# Patient Record
Sex: FEMALE | Race: WHITE | NOT HISPANIC OR LATINO | Employment: FULL TIME | URBAN - METROPOLITAN AREA
[De-identification: names, ages, dates, MRNs, and addresses within clinical notes are randomized per-mention and may not be internally consistent; named-entity substitution may affect disease eponyms.]

---

## 2023-06-11 ENCOUNTER — HOSPITAL ENCOUNTER (EMERGENCY)
Facility: CLINIC | Age: 28
Discharge: HOME | End: 2023-06-11
Attending: EMERGENCY MEDICINE | Admitting: EMERGENCY MEDICINE
Payer: COMMERCIAL

## 2023-06-11 VITALS
WEIGHT: 138 LBS | SYSTOLIC BLOOD PRESSURE: 118 MMHG | RESPIRATION RATE: 18 BRPM | OXYGEN SATURATION: 98 % | DIASTOLIC BLOOD PRESSURE: 76 MMHG | TEMPERATURE: 99 F | HEIGHT: 61 IN | HEART RATE: 101 BPM | BODY MASS INDEX: 26.06 KG/M2

## 2023-06-11 DIAGNOSIS — F41.9 ANXIETY: Primary | ICD-10-CM

## 2023-06-11 LAB — SARS-COV-2 RNA RESP QL NAA+PROBE: NEGATIVE

## 2023-06-11 PROCEDURE — 99214 OFFICE O/P EST MOD 30 MIN: CPT | Performed by: STUDENT IN AN ORGANIZED HEALTH CARE EDUCATION/TRAINING PROGRAM

## 2023-06-11 PROCEDURE — 90791 PSYCH DIAGNOSTIC EVALUATION: CPT

## 2023-06-11 PROCEDURE — 99285 EMERGENCY DEPT VISIT HI MDM: CPT | Mod: 25

## 2023-06-11 PROCEDURE — 250N000013 HC RX MED GY IP 250 OP 250 PS 637: Performed by: STUDENT IN AN ORGANIZED HEALTH CARE EDUCATION/TRAINING PROGRAM

## 2023-06-11 PROCEDURE — 87635 SARS-COV-2 COVID-19 AMP PRB: CPT | Performed by: EMERGENCY MEDICINE

## 2023-06-11 RX ORDER — SPIRONOLACTONE 100 MG/1
100 TABLET, FILM COATED ORAL DAILY
COMMUNITY

## 2023-06-11 RX ORDER — PROPRANOLOL HCL 60 MG
60 CAPSULE, EXTENDED RELEASE 24HR ORAL DAILY
COMMUNITY

## 2023-06-11 RX ORDER — CLONAZEPAM 0.5 MG/1
0.5 TABLET ORAL ONCE
Status: COMPLETED | OUTPATIENT
Start: 2023-06-11 | End: 2023-06-11

## 2023-06-11 RX ORDER — BUSPIRONE HYDROCHLORIDE 15 MG/1
15 TABLET ORAL 2 TIMES DAILY
COMMUNITY

## 2023-06-11 RX ORDER — HYDROXYZINE HYDROCHLORIDE 25 MG/1
25 TABLET, FILM COATED ORAL 3 TIMES DAILY PRN
COMMUNITY
End: 2023-06-16

## 2023-06-11 RX ORDER — GABAPENTIN 100 MG/1
CAPSULE ORAL
Qty: 63 CAPSULE | Refills: 0 | Status: SHIPPED | OUTPATIENT
Start: 2023-06-11 | End: 2023-06-25

## 2023-06-11 RX ORDER — DESVENLAFAXINE 50 MG/1
50 TABLET, FILM COATED, EXTENDED RELEASE ORAL DAILY
COMMUNITY

## 2023-06-11 RX ADMIN — CLONAZEPAM 0.5 MG: 0.5 TABLET ORAL at 15:22

## 2023-06-11 ASSESSMENT — COLUMBIA-SUICIDE SEVERITY RATING SCALE - C-SSRS
TOTAL  NUMBER OF ABORTED OR SELF INTERRUPTED ATTEMPTS LIFETIME: NO
1. HAVE YOU WISHED YOU WERE DEAD OR WISHED YOU COULD GO TO SLEEP AND NOT WAKE UP?: NO
2. HAVE YOU ACTUALLY HAD ANY THOUGHTS OF KILLING YOURSELF?: NO
ATTEMPT LIFETIME: NO
TOTAL  NUMBER OF INTERRUPTED ATTEMPTS LIFETIME: NO
6. HAVE YOU EVER DONE ANYTHING, STARTED TO DO ANYTHING, OR PREPARED TO DO ANYTHING TO END YOUR LIFE?: NO

## 2023-06-11 ASSESSMENT — ACTIVITIES OF DAILY LIVING (ADL)
ADLS_ACUITY_SCORE: 33
ADLS_ACUITY_SCORE: 35

## 2023-06-11 NOTE — ED TRIAGE NOTES
Patient presents to the ED complaining of one week of anxiety.  Requesting to go to the EMPATH unit.       Triage Assessment     Row Name 06/11/23 1229       Triage Assessment (Adult)    Airway WDL WDL

## 2023-06-11 NOTE — DISCHARGE INSTRUCTIONS
Medication Instructions:    Start gabapentin 100mg three times daily for anxiety x 7 days. Increase to 200mg three times daily if 100mg is not effective.  Continue desvenlafaxine 50mg daily  Continue buspirone 15mg twice daily  Continue propranolol 60mg daily if needed  Hold hydroxyzine 25mg three times daily if needed until you know if gabapentin will be helpful or not    Aftercare Plan    1. Follow up with established providers and supports as scheduled. Continue taking medications as prescribed. Abstain from drugs and alcohol. Utilize your [facility] mental [facility] crisis team as needed. They are available 24/7. Contact information is listed below.     2. Referral for [facility] was placed for medication management support while awaiting next med management appointment. They will reach out to you within 24 ours    3. Attend outpatient therapy as scheduled, with Azeb Ramírez at [facility], scheduled for Tue 6/13/23    4. Follow-up with medication management appointment with Mariah Roldan at [facility], scheduled for 6/23/23    If I am feeling unsafe or I am in a crisis, I will:   Contact my established care providers   Call the National Suicide Prevention Lifeline: 148.883.5014   Go to the nearest emergency room   Call [phone]     Warning signs that I or other people might notice when a crisis is developing for me: changes to sleep, appetite or mood, increased anger, agitation or irritability, feeling depressed or hopeless, spending more time alone or talking less, increased crying, decreased productivity, seeing or hearing things that aren't there, thoughts of not wanting to live anymore or of actually killing myself, thoughts of hurting others    Things I am able to do on my own to cope or help me feel better: watching a favorite tv show or movie, listening to music I enjoy, going outside and breathing fresh air, going for a walk or exercising, taking a shower or bath, a cold or hot beverage, a  healthy snack, drawing/coloring/painting, journaling, singing or dancing, deep breathing     I can try practicing square breathing when I begin to feel anxious - inhale through the nose for the count of 4 and the first line on the square. Exhale through the mouth for the count of 4 for the second line of the square. Repeat to complete the square. Repeat the square as many times as needed.    I can also use my five senses to practice mindfulness and grounding. What are five things I can see, four things I can hear, three things I can feel, two things I can smell, and one thing I can taste.     Things that I am able to do with others to cope or help me feel better: sometimes just talking or spending time with someone else, sharing a meal or having coffee, watching a movie or playing a game, going for a walk or exercising    I can also use community resources including mental health hotlines, [facility] crisis teams, or apps.     Things I can use or do for distraction: movies/tv, music, reading, games, drawing/coloring/painting or other art, essential oils, exercise, cleaning/organizing, puzzles, crossword puzzles, word search, Sudoku       I can also download a meditation or relaxation bowen, like Calm, Headspace, or Insight Timer (all three offer a free version)    TIPP?stands for?Temperature,?Intense exercise,?Paced breathing, and?Paired muscle relaxation.     TEMPERATURE     When we re upset, our bodies often feel hot. To counter this, splash your face with cold water, hold an ice cube, or let the car s AC blow on your face. Changing your body temperature will help you cool down--both physically and emotionally.     INTENSE EXERCISE     Do intense exercise to match your intense emotion. You re not a marathon runner? That s okay, you don t need to be. Sprint down to the end of the street, jump in the pool for a few laps, or do jumping jacks until you ve tired yourself out. Increasing oxygen flow helps decrease stress  levels. Plus, it s hard to stay dangerously upset when you re exhausted.     PACED BREATHING     Even something as simple as controlling your breath can have a profound impact on reducing emotional pain. There are many different types of breathing exercises. If you have a favorite, breathe it out. If you don t, try a technique called  box breathing . Each breath interval will be four seconds long. Take in air four seconds, hold it in four seconds, breathe out four, and hold four. And then start again. Continue to focus on this breathing pattern until you feel more calm. Steady breathing reduces your body s fight or flight response.     PAIRED MUSCLE RELAXATION     The science of paired muscle relaxation is fascinating. When you tighten a voluntary muscle, relax it, and allow it to rest, the muscle will become more relaxed than it was before it was tightened. Relaxed muscles require less oxygen,?so your breathing and heart rate will slow down. Try this technique by focusing on a group of muscles, such as the muscles in your arms. Tighten the muscles as much as you can for five seconds. Then let go of the tension. Let the muscles relax, and you ll begin to relax, as well.     Changes I can make to support my mental health and wellness: Attend scheduled mental health therapy and psychiatric appointments. Take my medications as prescribed. Maintain a daily schedule/routine. Abstain from all mood altering substances, including drugs, alcohol, or medications not currently prescribed to me. Implement a self-care routine.      People in my life that I can ask for help: friends or family, trusted teachers/staff/colleagues, trusted members of my community or place of Confucianism, mental health crisis lines, or 911    Your [facility] has a mental health crisis team you can call 24/7: [facility] Adult, [phone]    Other things that are important when I m in crisis: to remember that the feelings I am having right now are  "temporary, and it won't feel like this forever, and that it is okay and important to ask for help    Crisis Lines  Crisis Text Line  Text 472639  You will be connected with a trained live crisis counselor to provide support.    Por kymberly, leoo  ESTER a 101850 o texto a 442-AYUDAME en WhatsApp    National Hope Line  1.800.SUICIDE [8704238]      Community Resources  Fast Tracker  Linking people to mental health and substance use disorder resources  Kiwi[employer].TE2     [place] Mental Health Warm Line  Peer to peer support  Monday thru Saturday, 12 pm to 10 pm  698.205.0109 or 0.481.412.1969  Text \"Support\" to 23680    National Louise on Mental Illness (LISSETTE)  144.753.9338 or 1.888.LISSETTE.HELPS      Mental Health Apps  My3  https://Gura Gearpp.org/    VirtualHopeBox  https://Sientra/apps/virtual-hope-box/      Additional Information  Today you were seen by a licensed mental health professional through Triage and Transition services, Behavioral [facility] Providers ([facility])  for a crisis assessment in the Emergency Department at [facility].  It is recommended that you follow up with your established providers (psychiatrist, mental health therapist, and/or primary care doctor - as relevant) as soon as possible. Coordinators from [facility] will be calling you in the next 24-48 hours to ensure that you have the resources you need.  You can also contact [facility] coordinators directly at [phone]. You may have been scheduled for or offered an appointment with a mental health provider. [facility] maintains an extensive network of licensed behavioral health providers to connect patients with the services they need.  We do not charge providers a fee to participate in our referral network.  We match patients with providers based on a patient's specific needs, insurance coverage, and location.  Our first effort will be to refer you to a provider within your care system, and will utilize providers outside your care " system as needed.

## 2023-06-11 NOTE — PROGRESS NOTES
27 year old female received from ED due to increased anxiety. Reports one week of unmanageable anxiety. She reports she had planned to move into a new housing situation which fell through and she is now living with her parents. She currently works as a special ed instructor for pre-k and the end of the school year is next week and she does not have another job lined up at this time. Patient states she sleeps okay but wakes every morning with anxiety and it does not go away. She has been taking buspar, pristiq, hydroxyzine, and propranolol as prescribed but does not notice any benefit. Patient has a therapist and psychiatrist with [facility]. Pt has had poor appetite recently. Denies SI/HI/AH/VH.     Nursing and risk assessments completed. Assessments reviewed with LMHP and physician. Admission information reviewed with patient. Patient given a tour of [facility] and instructions on using the facility. Questions regarding [facility] addressed. Pt safety search completed.

## 2023-06-11 NOTE — ED NOTES
Patient agrees to discharge plan. Discharge instructions reviewed with patient including follow-up care plan. Medications: sent to her pharmacy. Reviewed safety plan and outpatient resources. Denies SI and HI. All belongings that were brought into the hospital have been returned to patient. Escorted off the unit at 1620 accompanied by [facility] staff. Discharged to home via mother.

## 2023-06-11 NOTE — ED PROVIDER NOTES
History     Chief Complaint:  Anxiety       HPI   Paola Durbin is a 27 year old female presents emergency department with increased anxiety levels.  She is not having suicidal homicidal thoughts.  However her anxiety levels are very high as she is sad and weepy at times, feels like it is difficult to function.  Here requesting assessment in the empath unit.  She has been compliant with her medications and reports she had a medication change about a month ago but feels like is not working yet.  Patient denies any other medical complaints.      Independent Historian:   None - Patient Only    Review of External Notes:          Medications:    No current outpatient medications on file.      Past Medical History:    No past medical history on file.    Past Surgical History:    No past surgical history on file.     Physical Exam     Patient Vitals for the past 24 hrs:   BP Temp Temp src Pulse Resp SpO2   06/11/23 1230 124/75 98  F (36.7  C) Temporal 103 18 98 %        Physical Exam    Gen: Resting comfortably   Eyes: Clear conjunctiva, no discharge  Ears: External ears normal without swelling or drainage  Mouth: Mucous membranes moist  CV: regular rate  Resp: speaking in full sentences without any resp distress  Skin: warm dry well perfused  Neuro: Alert, no gross motor or sensory deficits,   Psych: pleasant, affect appropriate,         Emergency Department Course       Imaging:  No orders to display          Laboratory:  Labs Ordered and Resulted from Time of ED Arrival to Time of ED Departure   COVID-19 VIRUS (CORONAVIRUS) BY PCR - Normal       Result Value    SARS CoV2 PCR Negative          Procedures         Emergency Department Course & Assessments:             Interventions:  Medications - No data to display     Assessments:      Independent Interpretation (X-rays, CTs, rhythm strip):  None    Consultations/Discussion of Management or Tests:  None        Social Determinants of Health affecting care:    None    [facility]  [facility]     Impression & Plan    CMS Diagnoses:   and None          Medical Decision Making:  Patient presents emergency department increased anxiety levels.  Significantly impacting her life she is finding it difficult to function.  Denies any other acute medical complaints.  Patient is wondering about medication adjustments to her medicines.  Patient seems a good candidate for the empath unit, if her COVID test comes back negative we will work on getting her sent there for assessment and evaluation there.        Diagnosis:    ICD-10-CM    1. Anxiety  F41.9            Discharge Medications:  New Prescriptions    No medications on file          Clyde Gunter MD  6/11/2023   Clyde Gunter,*        Clyde Gunter MD  06/11/23 1345

## 2023-06-11 NOTE — ED PROVIDER NOTES
[facility] Unit - Psychiatric Consultation  [facility] Emergency Department    Paola Durbin MRN: [id number]   Age: 27 year old YOB: 1995     History     Chief Complaint   Patient presents with     Anxiety     HPI  Paola Durbin is a 27 year old female with history notable for anxiety and depression who presented to the ED with an exacerbation of anxiety for the past week. 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 She also recently signed a lease to move to a new place but her parents were mad about the neighborhood so she backed out of the lease and is still living at home.   She has been taking several medicines for anxiety and works closely with her APRN at [facility]. She recently started paroxetine and has been using propranolol AS NEEDED, buspirone TWICE DAILY for a year or so. Her APRN recently added hydroxyzine due to recently increased anxiety but pt finds this makes her drowsy and is not clearly helping with anxiety.  She does not regularly use alcohol, cannabis, or caffeine and denies use of these since the anxiety increased last Saturday.    She denies suicidal thoughts or morbid ideation. She sees her therapist weekly and has an appt with her APRN on the 23rd of this month. She feels triggered being on the unit and is reminded of a difficult time in childhood (age 9) when she was hospitalized for anxiety/depression.     She wishes to discharge home today.     Past Medical History  No past medical history on file.  No past surgical history on file.  busPIRone (BUSPAR) 15 MG tablet  desvenlafaxine (PRISTIQ) 50 MG 24 hr tablet  hydrOXYzine (ATARAX) 25 MG tablet  propranolol ER (INDERAL LA) 60 MG 24 hr capsule  spironolactone (ALDACTONE) 100 MG tablet      No Known Allergies  Family History  No family history on file.  Social History           Review of Systems  A medically appropriate review of  "systems was performed with pertinent positives and negatives noted in the HPI, and all other systems negative.    Physical Examination   BP: 124/75  Pulse: 103  Temp: 98  F (36.7  C)  Resp: 18  Height: 154.9 cm (5' 1\")  Weight: 62.6 kg (138 lb)  SpO2: 98 %    Physical Exam  General: Appears stated age.   Neuro: Alert and fully oriented. Extremities appear to demonstrate normal strength on visual inspection.   Integumentary/Skin: no rash visualized, normal color    Psychiatric Examination   Appearance: awake, alert, adequately groomed and appeared as age stated  Attitude:  cooperative  Eye Contact:  good  Mood:  anxious  Affect:  appropriate and in normal range and mood congruent  Speech:  clear, coherent  Psychomotor Behavior:  no evidence of tardive dyskinesia, dystonia, or tics  Thought Process:  logical, linear and goal oriented  Associations:  no loose associations  Thought Content:  no evidence of suicidal ideation or homicidal ideation and no evidence of psychotic thought  Insight:  good  Judgement:  intact  Oriented to:  time, person, and place  Attention Span and Concentration:  intact  Recent and Remote Memory:  intact  Language: able to name/identify objects without impairment  Fund of Knowledge: intact with awareness of current and past events    ED Course        Labs Ordered and Resulted from Time of ED Arrival to Time of ED Departure   COVID-19 VIRUS (CORONAVIRUS) BY PCR - Normal       Result Value    SARS CoV2 PCR Negative         Assessments & Plan (with Medical Decision Making)   Patient presenting with exacerbation of acute on chronic anxiety. She was interested in spending time on [facility] for anxiety management but on arrival felt more anxious and is interested in discharging. We did discuss medication adjustment for her anxiety and reviewed her current medicines. I did recommend gabapentin for her anxiety on a THREE TIMES A DAY basis and reviewed r/b/se. She agrees to trial. Ideally would be able " to minimize polypharmacy when feeling less distressed. She denies safety concerns and will be able to discharge as she is not holdable. Nursing notes reviewed noting no acute issues.     I have reviewed the assessment completed by the [facility].     Preliminary diagnosis:    ICD-10-CM    1. Anxiety  F41.9            Treatment Plan:  -continue DESVENLAFAXINE (PRISTIQ) 50mg  -continue BUSPIRONE 15mg TWICE DAILY  -continue HYDROXYZINE (VISTARIL) 25mg THREE TIMES A DAY AS NEEDED  -continue PROPRANOL 60mg daily AS NEEDED  -start GABAPENTIN 100mg THREE TIMES A DAY x 7 days then increase to 200mg THREE TIMES A DAY if 100mg ineffective  -discharge home  -f/u with therapy weekly  -f/u with psychiatry as scheduled on 6/23    --  Rebekah Mota NP   [facility] EMERGENCY DEPT  Em[facility] Unit     Rebekah Mota NP  06/11/23 9499
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
## 2023-06-12 ENCOUNTER — TELEPHONE (OUTPATIENT)
Dept: BEHAVIORAL HEALTH | Facility: CLINIC | Age: 28
End: 2023-06-12
Payer: COMMERCIAL

## 2023-06-12 NOTE — TELEPHONE ENCOUNTER
Mental Health &Addiction (MH&A)Transition Clinic (TC):     Provides Patient Support While Waiting to Access Programmatic and Outpatient MH&A Care and Provides Select Crisis Assessment Services     NURSING Referral Review  _________________________________________    This RN has reviewed this Medication Management referral to the Transition Clinic and deemed the referral   [x] Appropriate x(Tier 2)  [] Inappropriate   []Consulting     Based on the following criteria:    Pt has a psychiatric provider (or pending plan) in place for future prescribing: Yes:   Next Level of Care Patient Will Be Transitioned   To: Outpatient psychiatry   Provider(s): Mariah Roldan, MSN, CNP, PMHNP-BC    Location:  [facility].   Phone: [phone]  Date/Time: 6/23/2023        Timeframe until pt's scheduled psychiatry appointment is less than 6 months: Yes: 11 days     Pt takes psychiatric medications: Yes:   Current Medications       busPIRone (BUSPAR) 15 MG tablet Take 15 mg by mouth 2 times daily  desvenlafaxine (PRISTIQ) 50 MG 24 hr tablet Take 50 mg by mouth daily  gabapentin (NEURONTIN) 100 MG capsule Take 1 capsule (100 mg) by mouth 3 times daily for 7 days, THEN 2 capsules (200 mg) 3 times daily for 7 days.  hydrOXYzine (ATARAX) 25 MG tablet Take 25 mg by mouth 3 times daily as needed for itching  propranolol ER (INDERAL LA) 60 MG 24 hr capsule Take 60 mg by mouth daily         Pt's goals seem to align with this temporary service: Yes: Transition Clinic to bridge psychiatric care and psychiatric medication management until next Level of Care.         Any additional pertinent information regarding this referral: Patient was seen in the ED on 6/11/23. Per ED HPI. Paola Durbin is a 27 year old female with history notable for anxiety and depression who presented to the ED with an exacerbation of anxiety for the past week. 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 She also recently signed a lease to move to a new place but her parents were mad about the neighborhood so she backed out of the lease and is still living at home.   She has been taking several medicines for anxiety and works closely with her APRN at [facility]. She recently started paroxetine and has been using propranolol AS NEEDED, buspirone TWICE DAILY for a year or so. Her APRN recently added hydroxyzine due to recently increased anxiety but pt finds this makes her drowsy and is not clearly helping with anxiety.  She does not regularly use alcohol, cannabis, or caffeine and denies use of these since the anxiety increased last Saturday.     She denies suicidal thoughts or morbid ideation. She sees her therapist weekly and has an appt with her APRN on the 23rd of this month. She feels triggered being on the unit and is reminded of a difficult time in childhood (age 9) when she was hospitalized for anxiety/depression.     Initial contact w/ patient/parent: Transition Clinic RN attempt call to patient to confirm next level of care and collect additional referral information reached full Voice mail. TC Coordinator to contact this patient/patients guardian to schedule a New Person Visit with TC Provider Sol Alfaro.       Additional Scheduling Instructions for Transition Clinic Coordinator:   TC Coordinators:  This is a Medication Management only Referral.        Please call the patient at [phone] (home). Please schedule a NewPerson appointment with TC Provider Sol Alfaro as soon as possible or as indicated by the patient.       TC Coordinator, please inform this (patient/ parent/guardian/facility staff member) as to the purpose and benefit of the TC.      The Transition Clinic is a Temporary Service that helps to bridge the time to your next appointment.  It is not intended to be a long-term service and you are expected to attend your scheduled appointment with your  next provider.      Patient/Parent/ Facility Staff Member verbalized understanding     If you need support between appointments, please call [phone] and let them know you're seen by Transition Clinic Psychiatry.  You may also send a [employer] message to reach us.          RN Signature Wander Sheffield RN on 6/12/2023 at 11:44 AM        FW: Medication Management Referral  Received: Today  Michelle Phelan [facility] Rn Lina Lucas     Next Level of Care Patient Will Be Transitioned To: Outpatient psychiatry   Provider(s): Mariah Roldan   Location:   Date/Time: 6/23/2023     What Would Be Helpful from the Transition Clinic: Medication management appointments (ideally Wednesday's or Thursday's after 2pm       Thank you!           Previous Messages       ----- Message -----   From: Nkechi Guzman   Sent: 6/11/2023   3:44 PM CDT   To: Transition Clinic   Subject: Medication Management Referral                   Transition Clinic Ref[place]   [place]         Please Check Type of Referral Requested:       ____THERAPY: The Transition clinic is able to schedule patients without current medical insurance; these patient will be referred to our Social Work Care Coordinator for Medical Insurance              Assistance. We are open for referral for psychotherapy.       __X__MEDICATION only:  Referrals for Medication are ONLY accepted from the following areas (select): [facility] - HIGH PRIORITY                                       Suboxone and Opioid Management Referrals are automatically denied. TC Psychiatry cannot see patient without active medical insurance.   TC Psychiatry cannot accept patient with next level of care scheduled with PCP       GUARDIAN: If your patient is not their own Guardian, please provide the following:     Guardian Name:   Guardian Contact Information (Phone & Email) :   Guardian Address:     FOSTER CARE PROVIDER: If your patient lives at a Licensed Foster Care, please  provide the following:     Foster Provider:   Foster Provider Contact Information (Phone & Email):   Foster Provider Address:         Referring Provider Contact Name: Clarisse Villa; Phone Number: [phone]     Reason for Transition Clinic Referral: Medication management appointments until 6/23/2023     Next Level of Care Patient Will Be Transitioned To: Outpatient psychiatry   Provider(s): Mariah Roldan   Location:   Date/Time: 6/23/2023     What Would Be Helpful from the Transition Clinic: Medication management appointments (ideally Wednesday's or Thursday's after 2pm)      Needs: NO     Does Patient Have Access to Technology: Yes     Patient E-mail Address: [email or web address]     Current Patient Phone Number: [phone]     Clinician Gender Preference (if applicable): YES: Female     Patient location preference: [facility]     Nkechi Guzman

## 2023-06-12 NOTE — TELEPHONE ENCOUNTER
First attempt at reaching patient. Left message asking for a return call to schedule with the TC. Email sent to patient. Will postpone for follow up tomorrow.    Liana Laura  Transition Clinic Coordinator  Date and Time: 06/12/23 11:58 AM            ----- Message from Wander Sheffield RN sent at 6/12/2023 11:54 AM CDT -----  Regarding: FW: Medication Management Referral   Mental Health &Addiction (MH&A)Transition Clinic (TC):     Provides Patient Support While Waiting to Access Programmatic and Outpatient MH&A Care and Provides Select Crisis Assessment Services     NURSING Referral Review  _________________________________________    This RN has reviewed this Medication Management referral to the Transition Clinic and deemed the referral    Appropriate x(Tier 2)   Inappropriate   Consulting     Based on the following criteria:    Pt has a psychiatric provider (or pending plan) in place for future prescribing: Yes:   Next Level of Care Patient Will Be Transitioned   To: Outpatient psychiatry   Provider(s): Mariah Roldan, MSN, CNP, PMHNP-BC    Location:  [facility].   Phone: [phone]  Date/Time: 6/23/2023        Timeframe until pt's scheduled psychiatry appointment is less than 6 months: Yes: 11 days     Pt takes psychiatric medications: Yes:   Current Medications       busPIRone (BUSPAR) 15 MG tablet Take 15 mg by mouth 2 times daily  desvenlafaxine (PRISTIQ) 50 MG 24 hr tablet Take 50 mg by mouth daily  gabapentin (NEURONTIN) 100 MG capsule Take 1 capsule (100 mg) by mouth 3 times daily for 7 days, THEN 2 capsules (200 mg) 3 times daily for 7 days.  hydrOXYzine (ATARAX) 25 MG tablet Take 25 mg by mouth 3 times daily as needed for itching  propranolol ER (INDERAL LA) 60 MG 24 hr capsule Take 60 mg by mouth daily         Pt's goals seem to align with this temporary service: Yes: Transition Clinic to bridge psychiatric care and psychiatric medication management until next Level of Care.         Any  additional pertinent information regarding this referral: Patient was seen in the ED on 6/11/23. Per ED HPI. Paola Durbin is a 27 year old female with history notable for anxiety and depression who presented to the ED with an exacerbation of anxiety for the past week. 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 She also recently signed a lease to move to a new place but her parents were mad about the neighborhood so she backed out of the lease and is still living at home.   She has been taking several medicines for anxiety and works closely with her APRN at [facility]. She recently started paroxetine and has been using propranolol AS NEEDED, buspirone TWICE DAILY for a year or so. Her APRN recently added hydroxyzine due to recently increased anxiety but pt finds this makes her drowsy and is not clearly helping with anxiety.  She does not regularly use alcohol, cannabis, or caffeine and denies use of these since the anxiety increased last Saturday.     She denies suicidal thoughts or morbid ideation. She sees her therapist weekly and has an appt with her APRN on the 23rd of this month. She feels triggered being on the unit and is reminded of a difficult time in childhood (age 9) when she was hospitalized for anxiety/depression.     Initial contact w/ patient/parent: Transition Clinic RN attempt call to patient to confirm next level of care and collect additional referral information reached full Voice mail. TC Coordinator to contact this patient/patients guardian to schedule a New Person Visit with TC Provider Sol Alfaro.       Additional Scheduling Instructions for Transition Clinic Coordinator:   TC Coordinators:  This is a Medication Management only Referral.        Please call the patient at [phone] (home). Please schedule a NewPerson appointment with TC Provider Sol Alfaro as soon as possible or as  indicated by the patient.       TC Coordinator, please inform this (patient/ parent/guardian/facility staff member) as to the purpose and benefit of the TC.      The Transition Clinic is a Temporary Service that helps to bridge the time to your next appointment.  It is not intended to be a long-term service and you are expected to attend your scheduled appointment with your next provider.      Patient/Parent/ Facility Staff Member verbalized understanding     If you need support between appointments, please call [phone] and let them know you're seen by Transition Clinic Psychiatry.  You may also send a [employer] message to reach us.          RN Signature Wander Sheffield RN on 6/12/2023 at 11:44 AM        FW: Medication Management Referral  Received: Today  [facility] Lina Brooke     Next Level of Care Patient Will Be Transitioned To: Outpatient psychiatry   Provider(s): Mariah Roldan   Location:   Date/Time: 6/23/2023     What Would Be Helpful from the Transition Clinic: Medication management appointments (ideally Wednesday's or Thursday's after 2pm       Thank you!         Previous Messages       ----- Message -----   From: Nkechi Guzman   Sent: 6/11/2023   3:44 PM CDT   To: Transition Clinic   Subject: Medication Management Referral                   Transition Clinic Referral   [place]         Please Check Type of Referral Requested:       ____THERAPY: The Transition clinic is able to schedule patients without current medical insurance; these patient will be referred to our Social Work Care Coordinator for Medical Insurance              Assistance. We are open for referral for psychotherapy.       __X__MEDICATION only:  Referrals for Medication are ONLY accepted from the following areas (select): [facility] - HIGH PRIORITY                                       Suboxone and Opioid Management Referrals are automatically denied. TC Psychiatry cannot see patient  without active medical insurance.   TC Psychiatry cannot accept patient with next level of care scheduled with PCP       GUARDIAN: If your patient is not their own Guardian, please provide the following:     Guardian Name:   Guardian Contact Information (Phone & Email) :   Guardian Address:     FOSTER CARE PROVIDER: If your patient lives at a Licensed Foster Care, please provide the following:     Foster Provider:   Foster Provider Contact Information (Phone & Email):   Foster Provider Address:         Referring Provider Contact Name: Clarisse Villa; Phone Number: [phone]     Reason for Transition Clinic Referral: Medication management appointments until 6/23/2023     Next Level of Care Patient Will Be Transitioned To: Outpatient psychiatry   Provider(s): Mariah Roldan   Location:   Date/Time: 6/23/2023     What Would Be Helpful from the Transition Clinic: Medication management appointments (ideally Wednesday's or Thursday's after 2pm)      Needs: NO     Does Patient Have Access to Technology: Yes     Patient E-mail Address: [email or web address]     Current Patient Phone Number: [phone]     Clinician Gender Preference (if applicable): YES: Female     Patient location preference: [place]                              ----- Message -----  From: Michelle Pehlan  Sent: 6/12/2023   7:12 AM CDT  To: Transition Clinic [place]  Subject: FW: Medication Management Referral               Hello,    Med-    Next Level of Care Patient Will Be Transitioned To: Outpatient psychiatry   Provider(s): Mariah Roldan   Location:   Date/Time: 6/23/2023     What Would Be Helpful from the Transition Clinic: Medication management appointments (ideally Wednesday's or Thursday's after 2pm      Thank you!  ----- Message -----  From: Nkechi Guzman  Sent: 6/11/2023   3:44 PM CDT  To: Transition Clinic  Subject: Medication Management Referral                   Transition Clinic Referral   [place]          Please Check Type of Referral Requested:       ____THERAPY: The Transition clinic is able to schedule patients without current medical insurance; these patient will be referred to our Social Work Care Coordinator for Medical Insurance              Assistance. We are open for referral for psychotherapy.       __X__MEDICATION only:  Referrals for Medication are ONLY accepted from the following areas (select): [facility] - HIGH PRIORITY                                       Suboxone and Opioid Management Referrals are automatically denied. TC Psychiatry cannot see patient without active medical insurance.   TC Psychiatry cannot accept patient with next level of care scheduled with PCP      GUARDIAN: If your patient is not their own Guardian, please provide the following:    Guardian Name:  Guardian Contact Information (Phone & Email) :  Guardian Address:     FOSTER CARE PROVIDER: If your patient lives at a Licensed Foster Care, please provide the following:    Foster Provider:  Foster Provider Contact Information (Phone & Email):  Foster Provider Address:         Referring Provider Contact Name: Clarisse Villa; Phone Number: [phone]    Reason for Transition Clinic Referral: Medication management appointments until 6/23/2023    Next Level of Care Patient Will Be Transitioned To: Outpatient psychiatry  Provider(s): Mariah Roldan  Location:   Date/Time: 6/23/2023    What Would Be Helpful from the Transition Clinic: Medication management appointments (ideally Wednesday's or Thursday's after 2pm)     Needs: NO    Does Patient Have Access to Technology: Yes    Patient E-mail Address: [email or web address]    Current Patient Phone Number: [phone]    Clinician Gender Preference (if applicable): YES: Female    Patient location preference: [facility]    Nkechi [employer]ens[employer]

## 2023-06-12 NOTE — TELEPHONE ENCOUNTER
Writer has fwd medication management referral only to TC RN pool as next level of care set and replied to referral source. Will wait to hear if referral is appropriate or inappropriate.    Michelle Phelan  06/12/2023  350    ----- Message from Nkechi Guzman sent at 6/11/2023  3:39 PM CDT -----  Regarding: Medication Management Referral  Transition Clinic Referral   [place]         Please Check Type of Referral Requested:       ____THERAPY: The Transition clinic is able to schedule patients without current medical insurance; these patient will be referred to our Social Work Care Coordinator for Medical Insurance              Assistance. We are open for referral for psychotherapy.       __X__MEDICATION only:  Referrals for Medication are ONLY accepted from the following areas (select): [facility] - HIGH PRIORITY                                       Suboxone and Opioid Management Referrals are automatically denied. TC Psychiatry cannot see patient without active medical insurance.   TC Psychiatry cannot accept patient with next level of care scheduled with PCP      GUARDIAN: If your patient is not their own Guardian, please provide the following:    Guardian Name:  Guardian Contact Information (Phone & Email) :  Guardian Address:     FOSTER CARE PROVIDER: If your patient lives at a Licensed Foster Care, please provide the following:    Foster Provider:  Foster Provider Contact Information (Phone & Email):  Foster Provider Address:         Referring Provider Contact Name: Clarisse Villa; Phone Number: [phone]    Reason for Transition Clinic Referral: Medication management appointments until 6/23/2023    Next Level of Care Patient Will Be Transitioned To: Outpatient psychiatry  Provider(s): Mariah Roldan  Location:   Date/Time: 6/23/2023    What Would Be Helpful from the Transition Clinic: Medication management appointments (ideally Wednesday's or Thursday's after 2pm)     Needs: NO    Does Patient  Have Access to Technology: Yes    Patient E-mail Address: [email or web address]    Current Patient Phone Number: [phone]    Clinician Gender Preference (if applicable): YES: Female    Patient location preference: [place]

## 2023-06-13 ENCOUNTER — TELEPHONE (OUTPATIENT)
Dept: BEHAVIORAL HEALTH | Facility: CLINIC | Age: 28
End: 2023-06-13
Payer: COMMERCIAL

## 2023-06-13 NOTE — TELEPHONE ENCOUNTER
Patient called back to TC. Scheduled new TC Med Management 6/16/2023. TC services explained to patient.    Liana Laura  Transition Clinic Coordinator  Date and Time: 06/13/23 8:50 AM

## 2023-06-13 NOTE — TELEPHONE ENCOUNTER
Second attempt at reaching patient. Left message asking for a return call to schedule with the TC. Referral will be closed, and tracker completed.    Liana Laura  Transition Clinic Coordinator  Date and Time: 06/13/23 8:18 AM    ----- Message from Wander Sheffield RN sent at 6/12/2023 11:54 AM CDT -----  Regarding: FW: Medication Management Referral   Mental Health &Addiction (MH&A)Transition Clinic (TC):     Provides Patient Support While Waiting to Access Programmatic and Outpatient MH&A Care and Provides Select Crisis Assessment Services     NURSING Referral Review  _________________________________________    This RN has reviewed this Medication Management referral to the Transition Clinic and deemed the referral    Appropriate x(Tier 2)   Inappropriate   Consulting     Based on the following criteria:    Pt has a psychiatric provider (or pending plan) in place for future prescribing: Yes:   Next Level of Care Patient Will Be Transitioned   To: Outpatient psychiatry   Provider(s): Mariah Roldan, MSN, CNP, PMHNP-BC    Location:  [facility].   Phone: [phone]  Date/Time: 6/23/2023        Timeframe until pt's scheduled psychiatry appointment is less than 6 months: Yes: 11 days     Pt takes psychiatric medications: Yes:   Current Medications       busPIRone (BUSPAR) 15 MG tablet Take 15 mg by mouth 2 times daily  desvenlafaxine (PRISTIQ) 50 MG 24 hr tablet Take 50 mg by mouth daily  gabapentin (NEURONTIN) 100 MG capsule Take 1 capsule (100 mg) by mouth 3 times daily for 7 days, THEN 2 capsules (200 mg) 3 times daily for 7 days.  hydrOXYzine (ATARAX) 25 MG tablet Take 25 mg by mouth 3 times daily as needed for itching  propranolol ER (INDERAL LA) 60 MG 24 hr capsule Take 60 mg by mouth daily         Pt's goals seem to align with this temporary service: Yes: Transition Clinic to bridge psychiatric care and psychiatric medication management until next Level of Care.         Any additional pertinent  information regarding this referral: Patient was seen in the ED on 6/11/23. Per ED HPI. Paola Durbin is a 27 year old female with history notable for anxiety and depression who presented to the ED with an exacerbation of anxiety for the past week. 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 She also recently signed a lease to move to a new place but her parents were mad about the neighborhood so she backed out of the lease and is still living at home.   She has been taking several medicines for anxiety and works closely with her APRN at [facility] [place]. She recently started paroxetine and has been using propranolol AS NEEDED, buspirone TWICE DAILY for a year or so. Her APRN recently added hydroxyzine due to recently increased anxiety but pt finds this makes her drowsy and is not clearly helping with anxiety.  She does not regularly use alcohol, cannabis, or caffeine and denies use of these since the anxiety increased last Saturday.     She denies suicidal thoughts or morbid ideation. She sees her therapist weekly and has an appt with her APRN on the 23rd of this month. She feels triggered being on the unit and is reminded of a difficult time in childhood (age 9) when she was hospitalized for anxiety/depression.     Initial contact w/ patient/parent: Transition Clinic RN attempt call to patient to confirm next level of care and collect additional referral information reached full Voice mail. TC Coordinator to contact this patient/patients guardian to schedule a New Person Visit with TC Provider Sol Alfaro.       Additional Scheduling Instructions for Transition Clinic Coordinator:   TC Coordinators:  This is a Medication Management only Referral.        Please call the patient at [phone] (home). Please schedule a NewPerson appointment with TC Provider Sol Alfaro as soon as possible or as indicated by the  patient.       TC Coordinator, please inform this (patient/ parent/guardian/facility staff member) as to the purpose and benefit of the TC.      The Transition Clinic is a Temporary Service that helps to bridge the time to your next appointment.  It is not intended to be a long-term service and you are expected to attend your scheduled appointment with your next provider.      Patient/Parent/ Facility Staff Member verbalized understanding     If you need support between appointments, please call [phone] and let them know you're seen by Transition Clinic Psychiatry.  You may also send a [employer] message to reach us.          RN Signature Wander Sheffield RN on 6/12/2023 at 11:44 AM        FW: Medication Management Referral  Received: Today  Michelle Phelan Transition Clinic Rn Lina Lucas     Next Level of Care Patient Will Be Transitioned To: Outpatient psychiatry   Provider(s): Mariah Roldan   Location:   Date/Time: 6/23/2023     What Would Be Helpful from the Transition Clinic: Medication management appointments (ideally Wednesday's or Thursday's after 2pm       Thank you!         Previous Messages       ----- Message -----   From: Nkechi Guzman   Sent: 6/11/2023   3:44 PM CDT   To: Transition Clinic   Subject: Medication Management Referral                   Transition Clinic Referral   [place]         Please Check Type of Referral Requested:       ____THERAPY: The Transition clinic is able to schedule patients without current medical insurance; these patient will be referred to our Social Work Care Coordinator for Medical Insurance              Assistance. We are open for referral for psychotherapy.       __X__MEDICATION only:  Referrals for Medication are ONLY accepted from the following areas (select): [facility] - HIGH PRIORITY                                       Suboxone and Opioid Management Referrals are automatically denied. TC Psychiatry cannot see patient without active medical  insurance.   TC Psychiatry cannot accept patient with next level of care scheduled with PCP       GUARDIAN: If your patient is not their own Guardian, please provide the following:     Guardian Name:   Guardian Contact Information (Phone & Email) :   Guardian Address:     FOSTER CARE PROVIDER: If your patient lives at a Licensed Foster Care, please provide the following:     Foster Provider:   Foster Provider Contact Information (Phone & Email):   Foster Provider Address:         Referring Provider Contact Name: Clarisse Villa; Phone Number: [phone]     Reason for Transition Clinic Referral: Medication management appointments until 6/23/2023     Next Level of Care Patient Will Be Transitioned To: Outpatient psychiatry   Provider(s): Mariah Roldan   Location:   Date/Time: 6/23/2023     What Would Be Helpful from the Transition Clinic: Medication management appointments (ideally Wednesday's or Thursday's after 2pm)      Needs: NO     Does Patient Have Access to Technology: Yes     Patient E-mail Address: [email or web address]     Current Patient Phone Number: [phone]     Clinician Gender Preference (if applicable): YES: Female     Patient location preference: [place]                              ----- Message -----  From: Michelle Phelan  Sent: 6/12/2023   7:12 AM CDT  To: Transition Clinic [place]  Subject: FW: Medication Management Referral               Hello,    Med-    Next Level of Care Patient Will Be Transitioned To: Outpatient psychiatry   Provider(s): Mariah Roldan   Location:   Date/Time: 6/23/2023     What Would Be Helpful from the Transition Clinic: Medication management appointments (ideally Wednesday's or Thursday's after 2pm      Thank you!  ----- Message -----  From: Nkechi Guzman  Sent: 6/11/2023   3:44 PM CDT  To: Transition Clinic  Subject: Medication Management Referral                   Transition Clinic Referral   [place]         Please Check Type of  Referral Requested:       ____THERAPY: The Transition clinic is able to schedule patients without current medical insurance; these patient will be referred to our Social Work Care Coordinator for Medical Insurance              Assistance. We are open for referral for psychotherapy.       __X__MEDICATION only:  Referrals for Medication are ONLY accepted from the following areas (select): [facility] - HIGH PRIORITY                                       Suboxone and Opioid Management Referrals are automatically denied. TC Psychiatry cannot see patient without active medical insurance.   TC Psychiatry cannot accept patient with next level of care scheduled with PCP      GUARDIAN: If your patient is not their own Guardian, please provide the following:    Guardian Name:  Guardian Contact Information (Phone & Email) :  Guardian Address:     FOSTER CARE PROVIDER: If your patient lives at a Licensed Foster Care, please provide the following:    Foster Provider:  Foster Provider Contact Information (Phone & Email):  Foster Provider Address:         Referring Provider Contact Name: Clarisse Villa; Phone Number: [phone]    Reason for Transition Clinic Referral: Medication management appointments until 6/23/2023    Next Level of Care Patient Will Be Transitioned To: Outpatient psychiatry  Provider(s): Mariah Roldan  Location:   Date/Time: 6/23/2023    What Would Be Helpful from the Transition Clinic: Medication management appointments (ideally Wednesday's or Thursday's after 2pm)     Needs: NO    Does Patient Have Access to Technology: Yes    Patient E-mail Address: [email or web address]    Current Patient Phone Number: [phone]    Clinician Gender Preference (if applicable): YES: Female    Patient location preference: [facility]
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
## 2023-06-16 ENCOUNTER — VIRTUAL VISIT (OUTPATIENT)
Dept: BEHAVIORAL HEALTH | Facility: CLINIC | Age: 28
End: 2023-06-16
Payer: COMMERCIAL

## 2023-06-16 DIAGNOSIS — F41.0 PANIC ATTACK: ICD-10-CM

## 2023-06-16 DIAGNOSIS — F33.0 MILD EPISODE OF RECURRENT MAJOR DEPRESSIVE DISORDER (H): Primary | ICD-10-CM

## 2023-06-16 DIAGNOSIS — F41.9 ANXIETY DISORDER, UNSPECIFIED TYPE: ICD-10-CM

## 2023-06-16 PROCEDURE — 99205 OFFICE O/P NEW HI 60 MIN: CPT | Mod: VID | Performed by: NURSE PRACTITIONER

## 2023-06-16 RX ORDER — CLONAZEPAM 0.5 MG/1
TABLET ORAL
Qty: 5 TABLET | Refills: 0 | Status: SHIPPED | OUTPATIENT
Start: 2023-06-16

## 2023-06-16 ASSESSMENT — PATIENT HEALTH QUESTIONNAIRE - PHQ9
10. IF YOU CHECKED OFF ANY PROBLEMS, HOW DIFFICULT HAVE THESE PROBLEMS MADE IT FOR YOU TO DO YOUR WORK, TAKE CARE OF THINGS AT HOME, OR GET ALONG WITH OTHER PEOPLE: VERY DIFFICULT
SUM OF ALL RESPONSES TO PHQ QUESTIONS 1-9: 8
SUM OF ALL RESPONSES TO PHQ QUESTIONS 1-9: 8

## 2023-06-16 ASSESSMENT — ANXIETY QUESTIONNAIRES
3. WORRYING TOO MUCH ABOUT DIFFERENT THINGS: NEARLY EVERY DAY
GAD7 TOTAL SCORE: 16
6. BECOMING EASILY ANNOYED OR IRRITABLE: SEVERAL DAYS
7. FEELING AFRAID AS IF SOMETHING AWFUL MIGHT HAPPEN: SEVERAL DAYS
2. NOT BEING ABLE TO STOP OR CONTROL WORRYING: NEARLY EVERY DAY
IF YOU CHECKED OFF ANY PROBLEMS ON THIS QUESTIONNAIRE, HOW DIFFICULT HAVE THESE PROBLEMS MADE IT FOR YOU TO DO YOUR WORK, TAKE CARE OF THINGS AT HOME, OR GET ALONG WITH OTHER PEOPLE: EXTREMELY DIFFICULT
5. BEING SO RESTLESS THAT IT IS HARD TO SIT STILL: MORE THAN HALF THE DAYS
8. IF YOU CHECKED OFF ANY PROBLEMS, HOW DIFFICULT HAVE THESE MADE IT FOR YOU TO DO YOUR WORK, TAKE CARE OF THINGS AT HOME, OR GET ALONG WITH OTHER PEOPLE?: EXTREMELY DIFFICULT
4. TROUBLE RELAXING: NEARLY EVERY DAY
GAD7 TOTAL SCORE: 16
1. FEELING NERVOUS, ANXIOUS, OR ON EDGE: NEARLY EVERY DAY
7. FEELING AFRAID AS IF SOMETHING AWFUL MIGHT HAPPEN: SEVERAL DAYS
GAD7 TOTAL SCORE: 16

## 2023-06-16 NOTE — PATIENT INSTRUCTIONS
"Start:  Clonazepam .5 mg (1 tab) per day as needed for severe anxiety, panic attack; #5    Continue:  Buspirone/Buspar 15 mg twice daily; TDD = 30 mg    Desvenlafaxine/Pristiq 50 mg daily    Gabapentin 200 mg (2 caps) 3x; TDD = 600 mg starting 6/18/2023    Propranolol/Inderal ER 60 mg daily      ------------------------      -You do not need to return to the [facility] for medication management    -Please make sure to keep the appointment for longitudinal outpatient psychiatry services  (see below):    Provider(s): LAMAR Vilchis   Location:  [facility]  Phone: [phone]  Date/Time: 6/23/2023 @ 1:30 pm      -----------------------    Patient Education   [facility]  What to Expect  Here's what to expect in the [facility].   About the clinic  The [facility] cares for you while you wait for long-term help.   You'll meet with a psychiatric doctor, who can give you medicine to help you feel better. You'll meet with them for about 5 visits or less. You'll see a different psychiatric doctor for your ongoing care. The clinic nurses and coordinators will help you guide your care.  If you have any questions or concerns, we'll be glad to talk with you.  About visits  Be open  At your visits, please talk openly about your problems. It may feel hard, but it's the best way for us to help you.  Cancelling visits  If you can't come to your visit, please call us right away at [phone]. If you don't cancel at least 24 hours (1 full day) before your visit, that's \"late cancellation.\"  Not showing up for your visits  Being very late is the same as not showing up. You will be a \"no show\" if:  You're more than 15 minutes late for a 30-minute (half hour) visit.  You're more than 30 minutes late for a 60-minute (full hour) visit.  If you cancel late or don't show up 2 times within 6 months, we may end your care.   If your ongoing care with a psychiatric doctor is cancelled, we may " end your care at the [facility]. If that happens, we'll give you referrals and enough medicine to last 3 months. The [facility] is only used to bridge the gap between your care.  Getting help between visits  If you need help between visits, you can call us Monday to Friday from 8 a.m. to 4:30 p.m. at [phone].  Emergency care   Call 911 or go to the nearest emergency department if your life or someone else's life is in danger.  Call 988 anytime to reach the [facility] Suicide and Crisis hotline.  Medicine refills  To refill your medicine, call your pharmacy. You can also call the [facility] at [phone], Monday to Friday, 8 a.m. to 4:30 p.m. It can take 1 to 3 business days to get a refill.   Forms, letters, and tests  You may have papers to fill out, like FMLA, short-term disability, and workability. We'll find out if we can do them and let you know. It can take 5 to 7 business days to get them filled out, and we may need you to come in for a visit.  Before we can give you medicine for ADHD, we may refer you to get tested for it or confirm it another way.  We don't do mental health checks ordered by the court.   We don't do mental health testing, but we can refer you to get tested.   Thank you for choosing us for your care.  For informational purposes only. Not to replace the advice of your health care provider. Copyright   2022 [facility]. All rights reserved. [employer] [id number] - 12/22.

## 2023-06-16 NOTE — PROGRESS NOTES
"  Mental Health and Collaborative Care Psychiatry Service Rooming Note      Most pressing mental health concern at this time: anxiety      Any new physical health conditions or diagnoses affecting you that we should be aware of: denies      Side effects related to medications patient would like to discuss with the provider:  Yes -tired and sleepy from Gabapentin, slight headaches      Are you taking your medications as prescribed?  yes  If not, why? NA      Do you need refills of any of the medications?  no  If so, which ones? NA      Are you taking any recreational substances? ETOH x 2 a week, occasionally THC      Is there any chance you are pregnant? No  Do you use birth control? IUD      Provider notified  N/A      Add attendance guidelines .phrase here   Care team has reviewed attendance agreement with patient. Patient advised that two failed appointments within 6 months may lead to termination of current episode of care.      **If appointment is with [facility]-include the following:      \"The [facility] is a temporary psychiatry service that helps to bridge the time to your next appointment. It is not intended to be a long-term service and you are expected to attend your scheduled appointment with your next provider.\"    [ x ] Patient/Parent verbalized understanding     If you need support between appointments, please call [phone] and let them know you're seen by [facility]. You may also send a [employer] message to reach us.    New (awaiting) Mental health provider or next programming: Mariah Roldan, MSN, CNP, PMHNP-BC    Location:  [facility].   Date of scheduled apt: 6/23/23    Patient would like the video visit invitation sent by:   Send to email: [email or web address]      Ricarda Estevez LPN  June 16, 2023  2:16 PM        "

## 2023-10-22 ENCOUNTER — HEALTH MAINTENANCE LETTER (OUTPATIENT)
Age: 28
End: 2023-10-22

## 2024-05-03 ENCOUNTER — OFFICE VISIT (OUTPATIENT)
Dept: URGENT CARE | Facility: URGENT CARE | Age: 29
End: 2024-05-03
Payer: COMMERCIAL

## 2024-05-03 VITALS
TEMPERATURE: 98 F | RESPIRATION RATE: 14 BRPM | HEART RATE: 76 BPM | DIASTOLIC BLOOD PRESSURE: 73 MMHG | OXYGEN SATURATION: 98 % | SYSTOLIC BLOOD PRESSURE: 108 MMHG

## 2024-05-03 DIAGNOSIS — B35.8 TINEA FACIALE: Primary | ICD-10-CM

## 2024-05-03 PROCEDURE — 99203 OFFICE O/P NEW LOW 30 MIN: CPT | Performed by: PHYSICIAN ASSISTANT

## 2024-05-03 RX ORDER — ALBUTEROL SULFATE 90 UG/1
1-2 AEROSOL, METERED RESPIRATORY (INHALATION)
COMMUNITY
Start: 2022-10-13

## 2024-05-03 RX ORDER — KETOCONAZOLE 20 MG/G
CREAM TOPICAL 2 TIMES DAILY
Qty: 60 G | Refills: 0 | Status: SHIPPED | OUTPATIENT
Start: 2024-05-03 | End: 2024-05-17

## 2024-05-03 RX ORDER — NORTRIPTYLINE HCL 25 MG
CAPSULE ORAL
COMMUNITY
Start: 2024-01-04

## 2024-05-03 RX ORDER — ARIPIPRAZOLE 5 MG/1
5 TABLET ORAL EVERY MORNING
COMMUNITY
Start: 2024-04-22

## 2024-05-03 RX ORDER — FLUCONAZOLE 200 MG/1
TABLET ORAL
Qty: 3 TABLET | Refills: 0 | Status: SHIPPED | OUTPATIENT
Start: 2024-05-03

## 2024-05-03 RX ORDER — VILAZODONE HYDROCHLORIDE 40 MG/1
TABLET ORAL
COMMUNITY
Start: 2024-04-24

## 2024-05-03 RX ORDER — HYDROXYZINE HYDROCHLORIDE 25 MG/1
25 TABLET, FILM COATED ORAL
COMMUNITY
Start: 2023-06-10

## 2024-05-03 NOTE — PROGRESS NOTES
Patient presents with:  Urgent Care: Pt presents with an itchy rash, thinks it's ring worm. Pt just came from a trip out of the country and thinks she got it from there.    (B35.8) Tinea faciale  (primary encounter diagnosis)  Comment:   Plan: ketoconazole (NIZORAL) 2 % external cream,         fluconazole (DIFLUCAN) 200 MG tablet            At the end of the encounter, I discussed results, diagnosis, medications. Discussed red flags for immediate return to clinic/ER, as well as indications for follow up if no improvement. Patient understood and agreed to plan. Patient was stable for discharge     If not improving or if condition worsens, follow up with your Primary Care Provider        SUBJECTIVE:   Paola Durbin is a 28 year old female who presents today with a rash on the right side of her face for the past week.  She was recently in [facility].  The rash started while she was there.  It started as a red raised bump and is now a ring shape.  Slightly itchy.        Current Outpatient Medications   Medication Sig Dispense Refill    Multiple Vitamins-Iron (DAILY-BALJINDER/IRON/BETA-CAROTENE) TABS TAKE 1 TABLET BY MOUTH DAILY. (Patient not taking: Reported on 10/19/2020) 30 tablet 7     Social History     Tobacco Use    Smoking status: Never Smoker    Smokeless tobacco: Never Used   Substance Use Topics    Alcohol use: Not on file     Family History   Problem Relation Age of Onset    Diabetes Mother     Diabetes Father          ROS:    10 point ROS of systems including Constitutional, Eyes, Respiratory, Cardiovascular, Gastroenterology, Genitourinary, Integumentary, Muscularskeletal, Psychiatric ,neurological were all negative except for pertinent positives noted in my HPI       OBJECTIVE:  /73   Pulse 76   Temp 98  F (36.7  C) (Tympanic)   Resp 14   SpO2 98%   Physical Exam:  GENERAL APPEARANCE: healthy, alert and no distress  SKIN: Erythematous annular lesion with central clearing on right jaw line

## 2024-05-03 NOTE — PATIENT INSTRUCTIONS
(B35.8) Tinea faciale  (primary encounter diagnosis)  Comment:   Plan: ketoconazole (NIZORAL) 2 % external cream,         fluconazole (DIFLUCAN) 200 MG tablet

## 2024-12-15 ENCOUNTER — HEALTH MAINTENANCE LETTER (OUTPATIENT)
Age: 29
End: 2024-12-15